# Patient Record
Sex: FEMALE
[De-identification: names, ages, dates, MRNs, and addresses within clinical notes are randomized per-mention and may not be internally consistent; named-entity substitution may affect disease eponyms.]

---

## 2021-08-13 ENCOUNTER — NURSE TRIAGE (OUTPATIENT)
Dept: OTHER | Facility: CLINIC | Age: 31
End: 2021-08-13

## 2021-08-14 NOTE — TELEPHONE ENCOUNTER
Reason for Disposition   MODERATE-SEVERE abdominal pain (e.g., interferes with normal activities, awakens from sleep)    Answer Assessment - Initial Assessment Questions  1. LOCATION: \"Where does it hurt? \"       Left lower abdomen/side and back    2. RADIATION: \"Does the pain shoot anywhere else? \" (e.g., chest, back, shoulder)      Radiating to the back    3. ONSET: \"When did the pain begin? \" (e.g., minutes, hours or days ago)       5 1/2 hours ago    4. ONSET: \"Gradual or sudden onset? \"      Gradual    5. PATTERN \"Does the pain come and go, or has it been constant since it started? \"       Constant    6. SEVERITY: \"How bad is the pain? \" \"What does it keep you from doing? \"  (e.g., Scale 1-10; mild, moderate, or severe)    - MILD (1-3): doesn't interfere with normal activities, abdomen soft and not tender to touch     - MODERATE (4-7): interferes with normal activities or awakens from sleep, tender to touch     - SEVERE (8-10): excruciating pain, doubled over, unable to do any normal activities      9/10    7. RECURRENT SYMPTOM: \"Have you ever had this type of abdominal pain before? \" If so, ask: \"When was the last time? \" and \"What happened that time? \"       Patient reports she only felt this type of pain before when she had kidney stones, that was 5-6 years ago    8. CAUSE: \"What do you think is causing the abdominal pain? \"      Possibly kidney stones    9. RELIEVING/AGGRAVATING FACTORS: \"What makes it better or worse? \" (e.g., antacids, bowel movement, movement)      A little improvement when she got in the bath. But pain got worse since she took a 3rd tylenol    10. OTHER SYMPTOMS: \"Has there been any vaginal bleeding, fever, vomiting, diarrhea, or urine problems? \"        Vomiting    11. LANA: \"What date are you expecting to deliver? \"        February 7th    Protocols used: PREGNANCY - ABDOMINAL PAIN LESS THAN 20 WEEKS EGA-ADULT-AH    Brief description of triage: See above note    Triage indicates for patient to: See disposition    Care advice provided, patient verbalizes understanding; denies any other questions or concerns; instructed to call back for any new or worsening symptoms. This triage is a result of a call to [address] [place] [address]. Please do not respond to the triage nurse through this encounter. Any subsequent communication should be directly with the patient.

## 2021-09-26 ENCOUNTER — NURSE TRIAGE (OUTPATIENT)
Dept: OTHER | Facility: CLINIC | Age: 31
End: 2021-09-26

## 2021-09-26 NOTE — TELEPHONE ENCOUNTER
Brief description of triage: left back pain, bladder feels full but only urinating drops, decreased fetal movement,     Triage indicates for patient to Go to ED Now. Care advice provided, patient verbalizes understanding; denies any other questions or concerns; instructed to call back for any new or worsening symptoms. This triage is a result of a call to [address] [place] [address]. Please do not respond to the triage nurse through this encounter. Any subsequent communication should be directly with the patient. Reason for Disposition   [1] Unable to urinate (or only a few drops) > 4 hours AND     [2] bladder feels very full (e.g., palpable bladder or strong urge to urinate)    Answer Assessment - Initial Assessment Questions  1. ONSET: \"When did the pain begin? \"       3pm yesterday    2. LOCATION: \"Where does it hurt? \" (upper, mid or lower back)      Entire left side of back    3. SEVERITY: \"How bad is the pain? \"  (e.g., Scale 1-10; mild, moderate, or severe)    - MILD (1-3): doesn't interfere with normal activities     - MODERATE (4-7): interferes with normal activities or awakens from sleep     - SEVERE (8-10): excruciating pain, unable to do any normal activities       8/10    4. PATTERN: \"Is the pain constant? \" (e.g., yes, no; constant, intermittent)       Constant    5. RADIATION: \"Does the pain shoot into your legs or elsewhere? \"      Does not radiate    6. CAUSE:  \"What do you think is causing the back pain? \"       Kidney stone? 7. BACK OVERUSE:  Batool Laughter recent lifting of heavy objects, strenuous work or exercise? \"      No    8. MEDICATIONS: \"What have you taken so far for the pain? \" (e.g., nothing, acetaminophen)      Tylenol    9. NEUROLOGIC SYMPTOMS: \"Do you have any weakness, numbness, or problems with bowel/bladder control? \"      No    10. OTHER SYMPTOMS: \"Do you have any other symptoms? \" (e.g., fever, abdominal pain, burning with urination, blood in urine, fluid leaking from vagina)        No    11.  LAAN: \"What date are you expecting to deliver?\"        2/7/22, 5 months    Protocols used: PREGNANCY - BACK PAIN-ADULT-AH

## 2021-11-01 ENCOUNTER — NURSE TRIAGE (OUTPATIENT)
Dept: OTHER | Facility: CLINIC | Age: 31
End: 2021-11-01

## 2021-11-02 NOTE — TELEPHONE ENCOUNTER
Brief description of triage: Geena Dawson was calling to ask about a nephrostomy tube that looks infected and is red and purulent. After discussing she mentioned that her surgeon told her to go to ED. I also stated she should follow her 's recommendation. Triage indicates for patient to No triage completed. Care advice provided, patient verbalizes understanding; denies any other questions or concerns; instructed to call back for any new or worsening symptoms. This triage is a result of a call to [address] [place] [address]. Please do not respond to the triage nurse through this encounter. Any subsequent communication should be directly with the patient. Reason for Disposition   Caller has already spoken with the PCP and has no further questions. Answer Assessment - Initial Assessment Questions  1. TYPE: \"What type of ostomy do you have? \" (e.g., ileostomy, colostomy; temporary, long-term [permanent]). Nephrostomy tube    2. LOCATION: \"Where is the ostomy located? \"      Left side    3. DURATION: \"How long have you had the ostomy? \" (e.g., days, weeks, months, years). 2 weeks tomorrow    4. MAIN CONCERN: Carmen Estimable is your main concern or symptom today? \" (e.g., skin redness or irritation, bleeding, leaking, change in output or appearance of ostomy). Red and purulent     5. DAILY OSTOMY OUTPUT DIARY: \"Do you keep a daily diary of the output from your ostomy\"? 1/2 a bag every hour an 1/2    6. OSTOMY OUTPUT: \"How much stool output over a 24 hour period are you having? \" (e.g., 0 ml; consistency of water, milkshake, pudding)      Dark yellow    7. OTHER SYMPTOMS: Ryan Annaies you having any other symptoms? \" (e.g., fever, vomiting, blood in stool, abdominal pain, dizziness)      Back pain.     Protocols used: NO CONTACT OR DUPLICATE CONTACT CALL-ADULT-, OSTOMY SYMPTOMS AND QUESTIONS-ADULT-

## 2023-04-25 PROBLEM — N20.1 LEFT URETERAL STONE: Status: ACTIVE | Noted: 2023-04-25

## 2023-04-25 PROBLEM — R87.611 PAPANICOLAOU SMEAR OF CERVIX WITH ATYPICAL SQUAMOUS CELLS CANNOT EXCLUDE HIGH GRADE SQUAMOUS INTRAEPITHELIAL LESION (ASC-H): Status: ACTIVE | Noted: 2023-04-25

## 2023-04-25 PROBLEM — R10.9 FLANK PAIN: Status: ACTIVE | Noted: 2023-04-25

## 2023-04-25 PROBLEM — H50.811: Status: ACTIVE | Noted: 2023-04-25

## 2023-04-25 PROBLEM — N13.30 HYDRONEPHROSIS: Status: ACTIVE | Noted: 2023-04-25

## 2023-04-25 PROBLEM — Q87.0 GOLDENHAR SYNDROME: Status: ACTIVE | Noted: 2023-04-25

## 2023-04-25 PROBLEM — N20.0 NEPHROLITHIASIS: Status: ACTIVE | Noted: 2023-04-25

## 2023-04-25 PROBLEM — Z14.8 CARRIER OF GENETIC DISORDER: Status: ACTIVE | Noted: 2023-04-25

## 2023-04-25 PROBLEM — R11.2 NAUSEA AND VOMITING: Status: ACTIVE | Noted: 2023-04-25

## 2023-04-25 PROBLEM — K21.9 GERD (GASTROESOPHAGEAL REFLUX DISEASE): Status: ACTIVE | Noted: 2023-04-25

## 2023-04-25 PROBLEM — H61.22 IMPACTED CERUMEN OF LEFT EAR: Status: ACTIVE | Noted: 2023-04-25

## 2023-04-25 PROBLEM — H90.12 CONDUCTIVE HEARING LOSS IN LEFT EAR: Status: ACTIVE | Noted: 2023-04-25

## 2023-04-25 PROBLEM — H93.8X2 SENSATION OF PLUGGED EAR ON LEFT SIDE: Status: ACTIVE | Noted: 2023-04-25

## 2023-04-25 PROBLEM — H72.92 PERFORATION OF LEFT TYMPANIC MEMBRANE: Status: ACTIVE | Noted: 2023-04-25

## 2023-04-25 PROBLEM — H90.71 MIXED CONDUCTIVE AND SENSORINEURAL HEARING LOSS OF RIGHT EAR: Status: ACTIVE | Noted: 2023-04-25

## 2023-04-25 RX ORDER — ACETAMINOPHEN 500 MG
TABLET ORAL EVERY 6 HOURS PRN
COMMUNITY
Start: 2022-01-30 | End: 2023-05-11 | Stop reason: WASHOUT

## 2023-04-25 RX ORDER — FERROUS SULFATE 325(65) MG
TABLET ORAL
COMMUNITY
Start: 2022-01-31 | End: 2023-05-11 | Stop reason: WASHOUT

## 2023-04-25 RX ORDER — DROSPIRENONE AND ETHINYL ESTRADIOL 0.02-3(28)
1 KIT ORAL DAILY
COMMUNITY
Start: 2022-07-15 | End: 2023-05-11 | Stop reason: WASHOUT

## 2023-04-25 RX ORDER — DOCUSATE SODIUM 100 MG/1
CAPSULE, LIQUID FILLED ORAL 2 TIMES DAILY PRN
COMMUNITY
Start: 2022-01-30 | End: 2023-05-11 | Stop reason: WASHOUT

## 2023-04-25 RX ORDER — IBUPROFEN 600 MG/1
TABLET ORAL EVERY 6 HOURS PRN
COMMUNITY
Start: 2022-01-30 | End: 2023-05-11 | Stop reason: WASHOUT

## 2023-05-11 ENCOUNTER — OFFICE VISIT (OUTPATIENT)
Dept: PRIMARY CARE | Facility: CLINIC | Age: 33
End: 2023-05-11
Payer: COMMERCIAL

## 2023-05-11 VITALS
HEIGHT: 61 IN | DIASTOLIC BLOOD PRESSURE: 70 MMHG | SYSTOLIC BLOOD PRESSURE: 110 MMHG | RESPIRATION RATE: 21 BRPM | BODY MASS INDEX: 26.43 KG/M2 | WEIGHT: 140 LBS | HEART RATE: 64 BPM

## 2023-05-11 DIAGNOSIS — H50.811 DUANE SYNDROME OF RIGHT EYE: ICD-10-CM

## 2023-05-11 DIAGNOSIS — Q87.0 GOLDENHAR SYNDROME: ICD-10-CM

## 2023-05-11 DIAGNOSIS — K21.9 GASTROESOPHAGEAL REFLUX DISEASE WITHOUT ESOPHAGITIS: ICD-10-CM

## 2023-05-11 DIAGNOSIS — Z00.00 ROUTINE GENERAL MEDICAL EXAMINATION AT A HEALTH CARE FACILITY: Primary | ICD-10-CM

## 2023-05-11 DIAGNOSIS — H90.A12 CONDUCTIVE HEARING LOSS OF LEFT EAR WITH RESTRICTED HEARING OF RIGHT EAR: ICD-10-CM

## 2023-05-11 PROBLEM — N20.1 LEFT URETERAL STONE: Status: RESOLVED | Noted: 2023-04-25 | Resolved: 2023-05-11

## 2023-05-11 PROBLEM — H72.92 PERFORATION OF LEFT TYMPANIC MEMBRANE: Status: RESOLVED | Noted: 2023-04-25 | Resolved: 2023-05-11

## 2023-05-11 PROBLEM — H93.8X2 SENSATION OF PLUGGED EAR ON LEFT SIDE: Status: RESOLVED | Noted: 2023-04-25 | Resolved: 2023-05-11

## 2023-05-11 PROBLEM — H61.22 IMPACTED CERUMEN OF LEFT EAR: Status: RESOLVED | Noted: 2023-04-25 | Resolved: 2023-05-11

## 2023-05-11 PROBLEM — N13.30 HYDRONEPHROSIS: Status: RESOLVED | Noted: 2023-04-25 | Resolved: 2023-05-11

## 2023-05-11 PROBLEM — R11.2 NAUSEA AND VOMITING: Status: RESOLVED | Noted: 2023-04-25 | Resolved: 2023-05-11

## 2023-05-11 PROBLEM — R10.9 FLANK PAIN: Status: RESOLVED | Noted: 2023-04-25 | Resolved: 2023-05-11

## 2023-05-11 PROCEDURE — 99395 PREV VISIT EST AGE 18-39: CPT | Performed by: INTERNAL MEDICINE

## 2023-05-11 PROCEDURE — 1036F TOBACCO NON-USER: CPT | Performed by: INTERNAL MEDICINE

## 2023-05-11 RX ORDER — LEVONORGESTREL AND ETHINYL ESTRADIOL 0.1-0.02MG
KIT ORAL
COMMUNITY
Start: 2022-06-04 | End: 2024-01-25 | Stop reason: ALTCHOICE

## 2023-05-11 ASSESSMENT — ENCOUNTER SYMPTOMS
NECK STIFFNESS: 0
EYE ITCHING: 0
POLYDIPSIA: 0
RECTAL PAIN: 0
NERVOUS/ANXIOUS: 0
FREQUENCY: 0
APPETITE CHANGE: 0
CHOKING: 0
COUGH: 0
UNEXPECTED WEIGHT CHANGE: 0
BRUISES/BLEEDS EASILY: 0
SEIZURES: 0
VOMITING: 0
CHEST TIGHTNESS: 0
ABDOMINAL DISTENTION: 0
TREMORS: 0
TROUBLE SWALLOWING: 0
NAUSEA: 0
APNEA: 0
ADENOPATHY: 0
WHEEZING: 0
EYE REDNESS: 0
SORE THROAT: 0
SLEEP DISTURBANCE: 0
POLYPHAGIA: 0
EYE PAIN: 0
RHINORRHEA: 0
MYALGIAS: 0
DYSURIA: 0
ANAL BLEEDING: 0
FEVER: 0
BACK PAIN: 0
DECREASED CONCENTRATION: 0
HYPERACTIVE: 0
ABDOMINAL PAIN: 0
CHILLS: 0
DIAPHORESIS: 0
NUMBNESS: 0
HEADACHES: 0
ARTHRALGIAS: 0
WOUND: 0
DIFFICULTY URINATING: 0
NECK PAIN: 0
FACIAL SWELLING: 0
SINUS PRESSURE: 0
VOICE CHANGE: 0
DIZZINESS: 0
AGITATION: 0
PHOTOPHOBIA: 0
FACIAL ASYMMETRY: 0
PALPITATIONS: 0
BLOOD IN STOOL: 0
ACTIVITY CHANGE: 0
SHORTNESS OF BREATH: 0
HALLUCINATIONS: 0
CONFUSION: 0
STRIDOR: 0
DIARRHEA: 0
LIGHT-HEADEDNESS: 0
JOINT SWELLING: 0
HEMATURIA: 0
CONSTIPATION: 0
DYSPHORIC MOOD: 0
FLANK PAIN: 0
FATIGUE: 0
SINUS PAIN: 0
WEAKNESS: 0
COLOR CHANGE: 0
EYE DISCHARGE: 0
SPEECH DIFFICULTY: 0

## 2023-05-11 NOTE — PATIENT INSTRUCTIONS
Continue to work on healthy diet and regular exercise for weight control  Continue to see GYN at least annually  Look for flu and covid boosters in fall  Follow up here in 1 year

## 2023-05-11 NOTE — PROGRESS NOTES
Subjective   Patient ID: Peng Jovel is a 33 y.o. female who presents for Annual Exam.  HPI    Pt here for physical.  She is working on more exercise.  She is down a few pounds in weight.    She had flu shot in fall and Covid plus the bivalent booster.      She sees GYN annually.  She has follow up next week.  She is only on OCP use.  She was having some ovarian cyst issues in the past.      She has no other complaints or issues at this time.  She had labs in early 1/2023 when having pelvic discomfort that only showed mild elevation in WBC.          Review of Systems   Constitutional:  Negative for activity change, appetite change, chills, diaphoresis, fatigue, fever and unexpected weight change.   HENT:  Negative for congestion, dental problem, drooling, ear discharge, ear pain, facial swelling, hearing loss, mouth sores, nosebleeds, postnasal drip, rhinorrhea, sinus pressure, sinus pain, sneezing, sore throat, tinnitus, trouble swallowing and voice change.    Eyes:  Negative for photophobia, pain, discharge, redness, itching and visual disturbance (wears glasses-has exam in July).   Respiratory:  Negative for apnea, cough, choking, chest tightness, shortness of breath, wheezing and stridor.    Cardiovascular:  Negative for chest pain, palpitations and leg swelling.   Gastrointestinal:  Negative for abdominal distention, abdominal pain, anal bleeding, blood in stool, constipation, diarrhea, nausea, rectal pain and vomiting.   Endocrine: Negative for cold intolerance, heat intolerance, polydipsia, polyphagia and polyuria.   Genitourinary:  Negative for decreased urine volume, difficulty urinating, dyspareunia, dysuria, enuresis, flank pain, frequency, genital sores, hematuria, menstrual problem, pelvic pain, urgency, vaginal bleeding, vaginal discharge and vaginal pain.   Musculoskeletal:  Negative for arthralgias, back pain, gait problem, joint swelling, myalgias, neck pain and neck stiffness.   Skin:   "Negative for color change, pallor, rash and wound.   Allergic/Immunologic: Negative for environmental allergies, food allergies and immunocompromised state.   Neurological:  Negative for dizziness, tremors, seizures, syncope, facial asymmetry, speech difficulty, weakness, light-headedness, numbness and headaches.   Hematological:  Negative for adenopathy. Does not bruise/bleed easily.   Psychiatric/Behavioral:  Negative for agitation, behavioral problems, confusion, decreased concentration, dysphoric mood, hallucinations, self-injury, sleep disturbance and suicidal ideas. The patient is not nervous/anxious and is not hyperactive.        Objective   /70   Pulse 64   Resp 21   Ht 1.549 m (5' 1\")   Wt 63.5 kg (140 lb)   BMI 26.45 kg/m²    Physical Exam  Constitutional:       Appearance: Normal appearance.   HENT:      Head: Normocephalic and atraumatic.      Right Ear: There is no impacted cerumen.      Left Ear: Tympanic membrane, ear canal and external ear normal. There is no impacted cerumen.      Ears:      Comments: Right ear with prosthesis   +hearing aide of left ear      Nose: Congestion present. No rhinorrhea.      Mouth/Throat:      Mouth: Mucous membranes are moist.      Pharynx: Oropharynx is clear. No oropharyngeal exudate or posterior oropharyngeal erythema.      Comments: Surgical scar noted from palate surgery   Eyes:      Extraocular Movements: Extraocular movements intact.      Conjunctiva/sclera: Conjunctivae normal.      Pupils: Pupils are equal, round, and reactive to light.   Neck:      Vascular: No carotid bruit.   Cardiovascular:      Rate and Rhythm: Normal rate and regular rhythm.      Pulses: Normal pulses.      Heart sounds: Normal heart sounds. No murmur heard.  Pulmonary:      Effort: Pulmonary effort is normal. No respiratory distress.      Breath sounds: Normal breath sounds. No wheezing, rhonchi or rales.   Abdominal:      General: Abdomen is flat. Bowel sounds are normal. " There is no distension.      Palpations: Abdomen is soft.      Tenderness: There is no abdominal tenderness.      Hernia: No hernia is present.   Musculoskeletal:         General: No swelling or tenderness. Normal range of motion.      Cervical back: Normal range of motion and neck supple.      Right lower leg: No edema.      Left lower leg: No edema.   Lymphadenopathy:      Cervical: No cervical adenopathy.   Skin:     General: Skin is warm and dry.      Findings: No lesion or rash.   Neurological:      General: No focal deficit present.      Mental Status: She is alert and oriented to person, place, and time.      Cranial Nerves: No cranial nerve deficit.      Sensory: No sensory deficit.      Motor: No weakness.   Psychiatric:         Mood and Affect: Mood normal.         Behavior: Behavior normal.         Thought Content: Thought content normal.         Judgment: Judgment normal.         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

## 2023-10-03 PROBLEM — D06.9 CERVICAL INTRAEPITHELIAL NEOPLASIA GRADE 3: Status: ACTIVE | Noted: 2023-10-03

## 2023-10-03 PROBLEM — N92.6 IRREGULAR MENSTRUAL CYCLE: Status: ACTIVE | Noted: 2023-10-03

## 2023-10-03 PROBLEM — H11.9 CONJUNCTIVAL LESION: Status: ACTIVE | Noted: 2023-10-03

## 2023-10-03 PROBLEM — D22.9 MELANOCYTIC NEVUS OF SKIN: Status: ACTIVE | Noted: 2023-10-03

## 2023-10-03 PROBLEM — N83.8 OVARIAN MASS, LEFT: Status: ACTIVE | Noted: 2023-10-03

## 2023-10-03 PROBLEM — H40.003 GLAUCOMA SUSPECT OF BOTH EYES: Status: ACTIVE | Noted: 2023-10-03

## 2023-10-03 RX ORDER — DROSPIRENONE AND ETHINYL ESTRADIOL 0.02-3(28)
1 KIT ORAL DAILY
COMMUNITY
Start: 2022-07-15 | End: 2023-10-16 | Stop reason: SDUPTHER

## 2023-10-05 ENCOUNTER — OFFICE VISIT (OUTPATIENT)
Dept: OTOLARYNGOLOGY | Facility: CLINIC | Age: 33
End: 2023-10-05
Payer: COMMERCIAL

## 2023-10-05 VITALS
TEMPERATURE: 97.9 F | HEIGHT: 62 IN | SYSTOLIC BLOOD PRESSURE: 125 MMHG | DIASTOLIC BLOOD PRESSURE: 68 MMHG | HEART RATE: 93 BPM | BODY MASS INDEX: 26.87 KG/M2 | WEIGHT: 146 LBS

## 2023-10-05 DIAGNOSIS — H61.22 EXCESSIVE CERUMEN IN LEFT EAR CANAL: ICD-10-CM

## 2023-10-05 DIAGNOSIS — H90.12 CONDUCTIVE HEARING LOSS OF LEFT EAR, UNSPECIFIED HEARING STATUS ON CONTRALATERAL SIDE: ICD-10-CM

## 2023-10-05 DIAGNOSIS — H90.71 MIXED CONDUCTIVE AND SENSORINEURAL HEARING LOSS OF RIGHT EAR, UNSPECIFIED HEARING STATUS ON CONTRALATERAL SIDE: Primary | ICD-10-CM

## 2023-10-05 DIAGNOSIS — Z96.21 STATUS POST PLACEMENT OF BONE ANCHORED HEARING AID (BAHA): ICD-10-CM

## 2023-10-05 DIAGNOSIS — H72.92 PERFORATION OF LEFT TYMPANIC MEMBRANE: ICD-10-CM

## 2023-10-05 PROCEDURE — 1036F TOBACCO NON-USER: CPT | Performed by: NURSE PRACTITIONER

## 2023-10-05 PROCEDURE — 99213 OFFICE O/P EST LOW 20 MIN: CPT | Performed by: NURSE PRACTITIONER

## 2023-10-05 ASSESSMENT — ENCOUNTER SYMPTOMS
OCCASIONAL FEELINGS OF UNSTEADINESS: 0
DEPRESSION: 0
LOSS OF SENSATION IN FEET: 0

## 2023-10-05 ASSESSMENT — PATIENT HEALTH QUESTIONNAIRE - PHQ9
SUM OF ALL RESPONSES TO PHQ9 QUESTIONS 1 AND 2: 0
1. LITTLE INTEREST OR PLEASURE IN DOING THINGS: NOT AT ALL
2. FEELING DOWN, DEPRESSED OR HOPELESS: NOT AT ALL

## 2023-10-05 ASSESSMENT — COLUMBIA-SUICIDE SEVERITY RATING SCALE - C-SSRS
1. IN THE PAST MONTH, HAVE YOU WISHED YOU WERE DEAD OR WISHED YOU COULD GO TO SLEEP AND NOT WAKE UP?: NO
6. HAVE YOU EVER DONE ANYTHING, STARTED TO DO ANYTHING, OR PREPARED TO DO ANYTHING TO END YOUR LIFE?: NO
2. HAVE YOU ACTUALLY HAD ANY THOUGHTS OF KILLING YOURSELF?: NO

## 2023-10-05 ASSESSMENT — PAIN SCALES - GENERAL: PAINLEVEL: 0-NO PAIN

## 2023-10-05 NOTE — PROGRESS NOTES
Subjective   Patient ID: Peng Jovel is a 33 y.o. female who presents for Cerumen Impaction.  HPI  Last saw Dr. Whiteside on 6/20/2023.  Here for routine ear cleaning and follow up.   She gets some pain in the left ear. Overall no complaints. No drainage.     Past Medical History:   Diagnosis Date    Acute upper respiratory infection, unspecified 01/29/2017    Viral upper respiratory tract infection    Conductive hearing loss, bilateral 06/15/2021    Conductive hearing loss, bilateral    Hydronephrosis 04/25/2023    Kidney stone     Left ureteral stone 04/25/2023    Microtia 06/15/2021    Microtia    Myopia, bilateral 06/07/2021    Bilateral myopia    Myopia, bilateral 06/07/2021    Myopia of both eyes with regular astigmatism    Other congenital deformities of skull, face and jaw     Hemifacial microsomia    Other disturbances of smell and taste 12/31/2019    Decreased sense of smell    Other symptoms and signs involving general sensations and perceptions 12/31/2019    Facial pressure    Otitis media, unspecified, left ear 01/11/2022    Acute left otitis media    Otitis media, unspecified, unspecified ear 06/15/2021    Ear infection    Otorrhea, left ear 06/15/2021    Otorrhea of left ear    Perforation of left tympanic membrane 04/25/2023    Personal history of (corrected) cleft lip and palate 09/02/2014    History of cleft palate with cleft lip    Personal history of other complications of pregnancy, childbirth and the puerperium 01/13/2022    History of pyelonephritis during pregnancy    Personal history of other diseases of the nervous system and sense organs     History of drainage from eye    Personal history of other diseases of the respiratory system     History of sore throat    Personal history of other diseases of the respiratory system 11/07/2017    History of paranasal sinus congestion    Personal history of other diseases of the respiratory system 12/31/2019    History of acute sinusitis     Personal history of other diseases of the respiratory system 2019    History of nasal discharge    Personal history of other diseases of the respiratory system 2019    History of sinusitis    Personal history of other diseases of the respiratory system 2020    History of chronic sinusitis    Personal history of other specified conditions 2019    History of facial pain    Personal history of other specified conditions 2020    History of nasal congestion    Personal history of other specified conditions 2020    History of nasal congestion    Sneezing 2020    Sneezing with watery eyes    Unspecified conjunctivitis 2017    Right conjunctivitis    Unspecified otitis externa, left ear 2021    Left otitis externa    Unspecified otitis externa, left ear 2016    Left otitis externa    Urinary tract infection, site not specified 2022    Bacterial UTI      Past Surgical History:   Procedure Laterality Date     SECTION, LOW TRANSVERSE      IR NEPHROSTOMY TUBE EXCHANGE  12/15/2021    IR NEPHROSTOMY TUBE EXCHANGE 12/15/2021 [facility]    MANDIBLE SURGERY  2014    Jaw Surgery    NOSE SURGERY  2014    Nose Surgery    OTHER SURGICAL HISTORY  2014    Biopsy Soft Palate Midline      Review of Systems   All other systems reviewed and are negative.    Objective   Physical Exam  Right Ear: Atresia with prosthesis. BAHA abudment site with some crusting and surrounding erythema. No drainage or tenderness.     Left Ear: External inspection of ear with no deformity, scars, or masses. EAC is with some non occluding cerumen that was removed using the microscope and suction .  TM with perforation about 60%. Some mucosalization of the superior aspect of the remnant TM. Middle ear is dry, no evidence of infection or cholesteatoma.     Assessment/Plan   -Use  Mupirocin ointment twice daily on right-sided BAHA abudment site  -Return to clinic in 6 months  for follow up.    All questions answered to patient satisfaction.

## 2023-10-16 DIAGNOSIS — Z30.8 ENCOUNTER FOR OTHER CONTRACEPTIVE MANAGEMENT: Primary | ICD-10-CM

## 2023-10-16 RX ORDER — DROSPIRENONE AND ETHINYL ESTRADIOL 0.02-3(28)
1 KIT ORAL DAILY
Qty: 84 TABLET | Refills: 2 | Status: SHIPPED
Start: 2023-10-16 | End: 2024-01-25 | Stop reason: ALTCHOICE

## 2024-01-02 ENCOUNTER — OFFICE VISIT (OUTPATIENT)
Dept: OTOLARYNGOLOGY | Facility: CLINIC | Age: 34
End: 2024-01-02
Payer: COMMERCIAL

## 2024-01-02 VITALS — HEIGHT: 61 IN | BODY MASS INDEX: 27.38 KG/M2 | WEIGHT: 145 LBS

## 2024-01-02 DIAGNOSIS — H90.A31 MIXED CONDUCTIVE AND SENSORINEURAL HEARING LOSS OF RIGHT EAR WITH RESTRICTED HEARING OF LEFT EAR: ICD-10-CM

## 2024-01-02 DIAGNOSIS — Q87.0 GOLDENHAR SYNDROME: ICD-10-CM

## 2024-01-02 DIAGNOSIS — H90.A12 CONDUCTIVE HEARING LOSS OF LEFT EAR WITH RESTRICTED HEARING OF RIGHT EAR: Primary | ICD-10-CM

## 2024-01-02 PROCEDURE — 1036F TOBACCO NON-USER: CPT | Performed by: OTOLARYNGOLOGY

## 2024-01-02 PROCEDURE — 92504 EAR MICROSCOPY EXAMINATION: CPT | Performed by: OTOLARYNGOLOGY

## 2024-01-02 PROCEDURE — 99213 OFFICE O/P EST LOW 20 MIN: CPT | Performed by: OTOLARYNGOLOGY

## 2024-01-02 NOTE — LETTER
January 2, 2024     Dalila CHADWICK DO  [address] [facility] [address]    Patient: Peng Jovel   YOB: 1990   Date of Visit: 1/2/2024       Dear Dr. Dalila CHADWICK DO:    I had the pleasure of seeing your patient, Peng Jovel today. Below are my notes for this consultation.  If you have questions, please do not hesitate to call me. Thank you for allowing me to participate in the care of your patient.        Sincerely,     Nancy Whiteside MD      CC: No Recipients  _____________________________________________________________________________    33 y.o. 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 She feels she's due for a left ear cleaning, she's had some fullness on that side.     - If she had exacerbation, use a week of ear drops   - RTC in 9 months

## 2024-01-02 NOTE — PATIENT INSTRUCTIONS
Welcome to Dr. Whiteside's clinic. We are here to assist you through your ENT care at [facility].  Dr. Whiteside is an Ear surgeon. This means that she specializes in taking care of patients with complex ear problems.     Dr. Whiteside's office number is [phone]. While you may see her at a satellite office, she has a team committed to help meet your healthcare needs at [facility]'s [facility]. This number is the most direct way to communicate with the office.     Carmen is Dr. Whiteside's  and she answers the office phone from 8am-4pm Mon-Fri. She can help you with many general questions and information. Questions that she cannot answer will be directed to the appropriate staff. You may need to leave a message. In this case, someone from the team will call you back.     Luis is Dr. Whiteside's primary nurse and can be reached by calling the office. Luis is in clinic with Dr. Whiteside's on Mondays and Tuesdays. Non-urgent calls will be returned on non-clinic days typically Thursdays.     Sometimes, other team members will also be involved in your care. These people may include dieticians, social workers, speech therapists, audiologist, neurologist, and physical therapist. Dr. Whiteside will provide these referrals as needed. Please let her know if you would like to request a specific referral.     For your convenience, Dr. Whiteside sees patients at several [facility] locations including [facility] and [facility] at the main campus of [facility]. While we try to make your appointments as convenient as possible, occasionally a visit to another location may be necessary to provide the best care for you. We look forward to working with you to meet your healthcare goals.     Dr. Whiteside makes every effort to run on time for your appointments. Therefore, if you are more than 30 minutes late unrelated to a scan or another appointment such therapy or audio you will have to reschedule.  
outpatient PT/outpatient PT services for gait and balance, endurance training/home

## 2024-01-02 NOTE — PROGRESS NOTES
History Of Present Illness:  Peng Jovel is a 33 y.o. 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 She does feel she's due for a left ear cleaning, she's had some fullness on that side.     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 She's not currently wearing her right-sided BAHA. She denies any significant ear problems since her last visit.     Surgical History:  She has a past surgical history that includes Nose surgery (2014); Mandible surgery (2014); Other surgical history (2014); IR nephrostomy tube exchange (12/15/2021); and  section, low transverse.     Allergies:  Adhesive tape-silicones, Diphenhydramine, and Sulfa (sulfonamide antibiotics)    Medications:   Current Outpatient Medications   Medication Instructions    drospirenone-ethinyl estradioL (Sheila, Angela,) 3-0.02 mg tablet 1 tablet, oral, Daily    Lessina 0.1-20 mg-mcg tablet       Review of Systems:   A comprehensive 10-point review of systems was obtained including constitutional, neurological, HEENT, pulmonary, cardiovascular, genito-urinary, and other pertinent systems and was negative except as noted in the HPI.      Physical Exam:  Constitutional   General appearance: Healthy-appearing, well-nourished, well groomed, in no acute distress.   Ability to communicate: Normal communication without aids, normal voice quality.       Head and face: Atraumatic with no masses, lesions, or scarring. Face exam consistent with Goldenhar on right.   Facial strength: Normal strength and symmetry, no synkinesis or facial tic.     Ears  Otoscopic examination:   Left: Ear canal with some waxy debris at meatus suctioned out,  "medial perforation in TM that's about 50%, TM slightly more erythematous than typical, middle ear mucosa not inflamed, no debris in middle ear space      Nose: Dorsum symmetric with no visible or palpable deformities.     Oral Cavity/Mouth  Lips, teeth, and gums: Normal lips, gums, and dentition.     Oropharynx: Mucosa moist, no lesions.     Neck: Symmetrical, trachea midline. No masses visible.     Neurological/Psychiatric  Cranial Nerve Examination: II - XII grossly intact.  Orientation to person, place, and time: Normal.  Mood and affect: Normal.     Skin: Normal without rashes or lesions.     Pulmonary  Respiratory effort: Chest expands symmetrically.     Cardiovascular: Good peripheral pulses  Peripheral vascular system: No varicosities, carotid pulse normal, no edema. No jugular venous distension.     Extremities: Appearance of extremities: Normal. Gait normal.     Procedure:  Ear Cleaning   Procedure: Cerumen Removal   Indiation: TM Not Visible   Location: Left Ear  Prep: Nothing was placed in the ear canal(s) prior to the procedure.   Preformed by: The procedure was performed by the Provider.  Visualization Instrument: A Microscope and Speculum were placed in the ear canal(s) to visualize the ear canal debris.   Ear cleaning Instruments: Suction were used during the procedure.   Outcome: The procedure was successful.   Patient Status: The patient tolerated the procedure well.   Complications: There were no complications.   Patient Instructions: Avoid Using Q-Tips     Last Recorded Vitals:  Height 1.549 m (5' 1\"), weight 65.8 kg (145 lb).     Assessment/Plan   33 y.o. 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 She feels she's due for a left ear cleaning, she's had some fullness on that side.     - If she had exacerbation, use a week of ear drops   - RTC in 9 months    "   Scribe Attestation:  By signing my name below, I, Marisa Resendiz , Scrjanine   attest that this documentation has been prepared under the direction and in the presence of Nancy Whiteside MD.    I have reviewed the documentation as scribed by Marisa Resendiz and agree with the notes.   Nancy Whiteside MD

## 2024-01-11 ENCOUNTER — APPOINTMENT (OUTPATIENT)
Dept: OTOLARYNGOLOGY | Facility: CLINIC | Age: 34
End: 2024-01-11
Payer: COMMERCIAL

## 2024-01-24 ENCOUNTER — APPOINTMENT (OUTPATIENT)
Dept: OBSTETRICS AND GYNECOLOGY | Facility: CLINIC | Age: 34
End: 2024-01-24
Payer: COMMERCIAL

## 2024-01-24 PROBLEM — Z01.419 WELL WOMAN EXAM: Status: ACTIVE | Noted: 2024-01-24

## 2024-01-24 NOTE — ASSESSMENT & PLAN NOTE
Thank you for your visit for well woman care.  At your visit, you had a pap smear performed. The results will be available in "[employer]"hart in two to three weeks.  You are happy with your current birth control. Let me know if you change your mind or have questions.

## 2024-01-25 ENCOUNTER — OFFICE VISIT (OUTPATIENT)
Dept: OBSTETRICS AND GYNECOLOGY | Facility: CLINIC | Age: 34
End: 2024-01-25
Payer: COMMERCIAL

## 2024-01-25 ENCOUNTER — APPOINTMENT (OUTPATIENT)
Dept: OTOLARYNGOLOGY | Facility: CLINIC | Age: 34
End: 2024-01-25
Payer: COMMERCIAL

## 2024-01-25 VITALS
HEIGHT: 61 IN | BODY MASS INDEX: 28.89 KG/M2 | SYSTOLIC BLOOD PRESSURE: 118 MMHG | WEIGHT: 153 LBS | DIASTOLIC BLOOD PRESSURE: 66 MMHG

## 2024-01-25 DIAGNOSIS — Z01.419 ENCOUNTER FOR ANNUAL ROUTINE GYNECOLOGICAL EXAMINATION: Primary | ICD-10-CM

## 2024-01-25 DIAGNOSIS — G43.119 INTRACTABLE MIGRAINE WITH AURA WITHOUT STATUS MIGRAINOSUS: ICD-10-CM

## 2024-01-25 DIAGNOSIS — D06.9 CERVICAL INTRAEPITHELIAL NEOPLASIA GRADE 3: ICD-10-CM

## 2024-01-25 DIAGNOSIS — Z30.41 ENCOUNTER FOR SURVEILLANCE OF CONTRACEPTIVE PILLS: ICD-10-CM

## 2024-01-25 PROCEDURE — 88175 CYTOPATH C/V AUTO FLUID REDO: CPT

## 2024-01-25 PROCEDURE — 99395 PREV VISIT EST AGE 18-39: CPT | Performed by: OBSTETRICS & GYNECOLOGY

## 2024-01-25 PROCEDURE — 1036F TOBACCO NON-USER: CPT | Performed by: OBSTETRICS & GYNECOLOGY

## 2024-01-25 PROCEDURE — 87624 HPV HI-RISK TYP POOLED RSLT: CPT

## 2024-01-25 RX ORDER — LEVONORGESTREL AND ETHINYL ESTRADIOL 0.1-0.02MG
KIT ORAL
Qty: 90 TABLET | Refills: 3 | Status: CANCELLED | OUTPATIENT
Start: 2024-01-25

## 2024-01-25 NOTE — PROGRESS NOTES
"Assessment   PLAN  Thank you for coming to your annual exam. We discussed healthy lifestyle, well woman screening, and other diagnoses listed below.    Peng was seen today for annual exam.  Diagnoses and all orders for this visit:  Encounter for annual routine gynecological examination (Primary)  Encounter for surveillance of contraceptive pills  Intractable migraine with aura without status migrainosus  -     drospirenone, contraceptive, 4 mg (28) tablet; Take 1 tablet by mouth once daily.    Discussed with patient regarding recent migraine with aura and risk for VTE. Pt amenable to try Slynd POP for now. Will continue to monitor.  Please return for your next visit in 1 year.    Seen and d/w Dr. Ayala.  Jailyn Mo MD    Subjective     Peng Jovel is a 33 y.o. female who is here for a routine exam.   PCP = DO NATA Sy Concerns: previously had dizzy spell/seeing stars while on period with migraine that followed, now taking OCPs continuously.  Other Concerns: none    GynHx:  Menstrual Pattern: continuous OCPs  Contraception: OCPs    Pap Hx:  Collected today  Last 2023 NILM  Last 2022 LEEP JAS 3    Preventative:  Last mammogram: due age 41  Last Colonoscopy: due age 46  DM Screening: due age 36  DEXA: due age 66  HPV vaccination: yes  Exercise: exercising when she can  Diet: no restrictions  Seat Belt Use: always    SoHx:  Living Situation: lives at home with son and   School/Employment:  at elementary school    PMH, PSH reviewed and edited.    FamHx: No fam hx of breast, uterine, ovarian, fallopian tube, or colon CA.    Objective   /66   Ht 1.549 m (5' 1\")   Wt 69.4 kg (153 lb)   LMP 12/07/2023   BMI 28.91 kg/m²      General:   Alert and oriented, in no acute distress   Neck: Supple. No visible thyromegaly.    Breast/Axilla: Normal to palpation bilaterally without masses, skin changes, or nipple discharge.    Abdomen: Soft, non-tender, without masses or " organomegaly   Vulva: Normal architecture without erythema, masses, or lesions.    Vagina: Normal mucosa without lesions, masses, or atrophy. No abnormal vaginal discharge.    Cervix: Normal without masses, lesions, or signs of cervicitis.    Uterus: Normal mobile, non-enlarged uterus    Adnexa: Normal without masses or lesions   Pelvic Floor No POP noted. No high tone pelvic floor    Psych Normal affect. Normal mood.

## 2024-02-07 LAB
CYTOLOGY CMNT CVX/VAG CYTO-IMP: NORMAL
HPV HR 12 DNA GENITAL QL NAA+PROBE: NEGATIVE
HPV HR GENOTYPES PNL CVX NAA+PROBE: NEGATIVE
HPV16 DNA SPEC QL NAA+PROBE: NEGATIVE
HPV18 DNA SPEC QL NAA+PROBE: NEGATIVE
LAB AP HPV GENOTYPE QUESTION: YES
LAB AP HPV HR: NORMAL
LAB AP TREATMENT HISTORY: NORMAL
LABORATORY COMMENT REPORT: NORMAL
LMP START DATE: NORMAL
PATH REPORT.TOTAL CANCER: NORMAL

## 2024-02-13 ENCOUNTER — APPOINTMENT (OUTPATIENT)
Dept: OTOLARYNGOLOGY | Facility: CLINIC | Age: 34
End: 2024-02-13
Payer: COMMERCIAL

## 2024-02-19 ENCOUNTER — APPOINTMENT (OUTPATIENT)
Dept: OTOLARYNGOLOGY | Facility: CLINIC | Age: 34
End: 2024-02-19
Payer: COMMERCIAL

## 2024-03-20 ENCOUNTER — APPOINTMENT (OUTPATIENT)
Dept: AUDIOLOGY | Facility: CLINIC | Age: 34
End: 2024-03-20
Payer: COMMERCIAL

## 2024-04-08 ENCOUNTER — OFFICE VISIT (OUTPATIENT)
Dept: OTOLARYNGOLOGY | Facility: CLINIC | Age: 34
End: 2024-04-08
Payer: COMMERCIAL

## 2024-04-08 VITALS
DIASTOLIC BLOOD PRESSURE: 73 MMHG | WEIGHT: 155.4 LBS | HEIGHT: 62 IN | RESPIRATION RATE: 16 BRPM | SYSTOLIC BLOOD PRESSURE: 117 MMHG | TEMPERATURE: 98.3 F | HEART RATE: 93 BPM | BODY MASS INDEX: 28.6 KG/M2 | OXYGEN SATURATION: 98 %

## 2024-04-08 DIAGNOSIS — H61.22 EXCESSIVE CERUMEN IN LEFT EAR CANAL: Primary | ICD-10-CM

## 2024-04-08 DIAGNOSIS — H72.92 PERFORATION OF LEFT TYMPANIC MEMBRANE: ICD-10-CM

## 2024-04-08 DIAGNOSIS — H90.A31 MIXED CONDUCTIVE AND SENSORINEURAL HEARING LOSS OF RIGHT EAR WITH RESTRICTED HEARING OF LEFT EAR: ICD-10-CM

## 2024-04-08 DIAGNOSIS — Z96.21 STATUS POST PLACEMENT OF BONE ANCHORED HEARING AID (BAHA): ICD-10-CM

## 2024-04-08 DIAGNOSIS — H90.A12 CONDUCTIVE HEARING LOSS OF LEFT EAR WITH RESTRICTED HEARING OF RIGHT EAR: ICD-10-CM

## 2024-04-08 PROCEDURE — 99213 OFFICE O/P EST LOW 20 MIN: CPT | Performed by: NURSE PRACTITIONER

## 2024-04-08 SDOH — ECONOMIC STABILITY: FOOD INSECURITY: WITHIN THE PAST 12 MONTHS, THE FOOD YOU BOUGHT JUST DIDN'T LAST AND YOU DIDN'T HAVE MONEY TO GET MORE.: NEVER TRUE

## 2024-04-08 SDOH — ECONOMIC STABILITY: FOOD INSECURITY: WITHIN THE PAST 12 MONTHS, YOU WORRIED THAT YOUR FOOD WOULD RUN OUT BEFORE YOU GOT MONEY TO BUY MORE.: NEVER TRUE

## 2024-04-08 ASSESSMENT — ENCOUNTER SYMPTOMS
OCCASIONAL FEELINGS OF UNSTEADINESS: 0
DEPRESSION: 0
LOSS OF SENSATION IN FEET: 0

## 2024-04-08 ASSESSMENT — LIFESTYLE VARIABLES
HOW OFTEN DO YOU HAVE SIX OR MORE DRINKS ON ONE OCCASION: NEVER
SKIP TO QUESTIONS 9-10: 1
HOW MANY STANDARD DRINKS CONTAINING ALCOHOL DO YOU HAVE ON A TYPICAL DAY: 1 OR 2
AUDIT-C TOTAL SCORE: 1
HOW OFTEN DO YOU HAVE A DRINK CONTAINING ALCOHOL: MONTHLY OR LESS

## 2024-04-08 ASSESSMENT — PAIN SCALES - GENERAL: PAINLEVEL: 0-NO PAIN

## 2024-04-08 NOTE — PROGRESS NOTES
Subjective   Patient ID: Peng Jovel is a 34 y.o. female who presents for Follow-up (Ear drainage).    HPI  Last saw Dr. Whiteside on 1/2/2024.  Here for routine ear cleaning and follow up.   Possible wax in the left ear. Overall no complaints. No drainage.     Past Medical History:   Diagnosis Date    Acute upper respiratory infection, unspecified 01/29/2017    Viral upper respiratory tract infection    Conductive hearing loss, bilateral 06/15/2021    Conductive hearing loss, bilateral    Hydronephrosis 04/25/2023    Kidney stone     Left ureteral stone 04/25/2023    Microtia 06/15/2021    Microtia    Myopia, bilateral 06/07/2021    Bilateral myopia    Myopia, bilateral 06/07/2021    Myopia of both eyes with regular astigmatism    Other congenital deformities of skull, face and jaw     Hemifacial microsomia    Other disturbances of smell and taste 12/31/2019    Decreased sense of smell    Other symptoms and signs involving general sensations and perceptions 12/31/2019    Facial pressure    Otitis media, unspecified, left ear 01/11/2022    Acute left otitis media    Otitis media, unspecified, unspecified ear 06/15/2021    Ear infection    Otorrhea, left ear 06/15/2021    Otorrhea of left ear    Perforation of left tympanic membrane 04/25/2023    Personal history of (corrected) cleft lip and palate 09/02/2014    History of cleft palate with cleft lip    Personal history of other complications of pregnancy, childbirth and the puerperium 01/13/2022    History of pyelonephritis during pregnancy    Personal history of other diseases of the nervous system and sense organs     History of drainage from eye    Personal history of other diseases of the respiratory system     History of sore throat    Personal history of other diseases of the respiratory system 11/07/2017    History of paranasal sinus congestion    Personal history of other diseases of the respiratory system 12/31/2019    History of acute sinusitis     Personal history of other diseases of the respiratory system 2019    History of nasal discharge    Personal history of other diseases of the respiratory system 2019    History of sinusitis    Personal history of other diseases of the respiratory system 2020    History of chronic sinusitis    Personal history of other specified conditions 2019    History of facial pain    Personal history of other specified conditions 2020    History of nasal congestion    Personal history of other specified conditions 2020    History of nasal congestion    Sneezing 2020    Sneezing with watery eyes    Unspecified conjunctivitis 2017    Right conjunctivitis    Unspecified otitis externa, left ear 2021    Left otitis externa    Unspecified otitis externa, left ear 2016    Left otitis externa    Urinary tract infection, site not specified 2022    Bacterial UTI      Past Surgical History:   Procedure Laterality Date    CERVICAL BIOPSY  W/ LOOP ELECTRODE EXCISION  2022     SECTION, LOW TRANSVERSE  2022    IR NEPHROSTOMY TUBE EXCHANGE  12/15/2021    IR NEPHROSTOMY TUBE EXCHANGE 12/15/2021 [facility]    MANDIBLE SURGERY  2014    Jaw Surgery    NOSE SURGERY  2014    Nose Surgery    OTHER SURGICAL HISTORY  2014    Biopsy Soft Palate Midline      Review of Systems   All other systems reviewed and are negative.    Objective   Physical Exam    Right Ear: Atresia with prosthesis. [facility].     Left Ear: External inspection of ear with no deformity, scars, or masses. EAC is with some non occluding cerumen that was removed using the microscope and suction .  TM with perforation about 60%. Some mucosalization of the superior aspect of the remnant TM. Middle ear is dry, no evidence of infection or cholesteatoma.     Assessment/Plan     -Return to clinic in 6 months for follow up.    All questions answered to patient satisfaction.

## 2024-04-23 ENCOUNTER — APPOINTMENT (OUTPATIENT)
Dept: OTOLARYNGOLOGY | Facility: CLINIC | Age: 34
End: 2024-04-23
Payer: COMMERCIAL

## 2024-04-29 ENCOUNTER — CLINICAL SUPPORT (OUTPATIENT)
Dept: AUDIOLOGY | Facility: CLINIC | Age: 34
End: 2024-04-29
Payer: COMMERCIAL

## 2024-04-29 DIAGNOSIS — H90.A12 CONDUCTIVE HEARING LOSS OF LEFT EAR WITH RESTRICTED HEARING OF RIGHT EAR: Primary | ICD-10-CM

## 2024-04-29 DIAGNOSIS — H90.A31 MIXED CONDUCTIVE AND SENSORINEURAL HEARING LOSS OF RIGHT EAR WITH RESTRICTED HEARING OF LEFT EAR: ICD-10-CM

## 2024-04-29 PROCEDURE — V5264 EAR MOLD/INSERT: HCPCS | Mod: LT | Performed by: AUDIOLOGIST

## 2024-04-29 NOTE — PROGRESS NOTES
"HEARING AID CHECK    Name:  Peng Jovel  :  1990  Age:  34 y.o.  Date of Evaluation:  2024    HISTORY  Patient seen today for a hearing aid check. She reported that the sound quality in her left hearing aid is poor, which she believes is due to needing a new ear mold.     Right ear: (Cochlear)SN: [id number]  Left ear: Sofie W63-VSLX: [id number]  Hearing aid under warranty until 2022\"     Otoscopic inspection indicated clear ear canals and visualization of the tympanic membrane on the left. Ear mold impression was taken without incidence. Ear mold impression will be sent to [facility]. Return in 3-4 weeks for updated hearing test and ear mold fitting. Ear mold billed today.      TREATMENT PLAN:  Follow up with Dr. Whiteside as recommended  Annual hearing assessment or sooner if changes or new symptoms arise  Continue full time use of bilateral hearing devices  Contact clinic with questions or concerns at [phone]    Time: 1571-0073    Completed by:  FLORENTIN Azul.  Doctor of Audiology Extern    Luciano Gaytan, CCC-A  Licensed Audiologist        "

## 2024-05-28 ENCOUNTER — APPOINTMENT (OUTPATIENT)
Dept: PRIMARY CARE | Facility: CLINIC | Age: 34
End: 2024-05-28
Payer: COMMERCIAL

## 2024-05-30 ENCOUNTER — OFFICE VISIT (OUTPATIENT)
Dept: OTOLARYNGOLOGY | Facility: CLINIC | Age: 34
End: 2024-05-30
Payer: COMMERCIAL

## 2024-05-30 VITALS
HEART RATE: 96 BPM | DIASTOLIC BLOOD PRESSURE: 76 MMHG | TEMPERATURE: 97.8 F | HEIGHT: 62 IN | SYSTOLIC BLOOD PRESSURE: 121 MMHG | BODY MASS INDEX: 28.34 KG/M2 | WEIGHT: 154 LBS

## 2024-05-30 DIAGNOSIS — H91.22 SUDDEN HEARING LOSS, LEFT: Primary | ICD-10-CM

## 2024-05-30 PROCEDURE — 99213 OFFICE O/P EST LOW 20 MIN: CPT | Performed by: NURSE PRACTITIONER

## 2024-05-30 PROCEDURE — 1036F TOBACCO NON-USER: CPT | Performed by: NURSE PRACTITIONER

## 2024-05-30 ASSESSMENT — PAIN SCALES - GENERAL: PAINLEVEL: 0-NO PAIN

## 2024-05-30 ASSESSMENT — ENCOUNTER SYMPTOMS
DEPRESSION: 0
OCCASIONAL FEELINGS OF UNSTEADINESS: 0
LOSS OF SENSATION IN FEET: 0

## 2024-05-30 NOTE — PROGRESS NOTES
Subjective   Patient ID: Pegn Jovel is a 34 y.o. female who presents for Ear Drainage.    Ear Drainage     Last visit with me on 4/8/2024.  Last saw Dr. Whiteside on 1/2/2024. Had upcoming appt but it was cancelled.  Patient states this morning she woke up and felt her hearing was significantly decreased in the left ear.  She thought maybe she had wax in the ear.  She denies having any ear drainage or pain.    Past Medical History:   Diagnosis Date    Acute upper respiratory infection, unspecified 01/29/2017    Viral upper respiratory tract infection    Conductive hearing loss, bilateral 06/15/2021    Conductive hearing loss, bilateral    Hydronephrosis 04/25/2023    Kidney stone     Left ureteral stone 04/25/2023    Microtia 06/15/2021    Microtia    Myopia, bilateral 06/07/2021    Bilateral myopia    Myopia, bilateral 06/07/2021    Myopia of both eyes with regular astigmatism    Other congenital deformities of skull, face and jaw     Hemifacial microsomia    Other disturbances of smell and taste 12/31/2019    Decreased sense of smell    Other symptoms and signs involving general sensations and perceptions 12/31/2019    Facial pressure    Otitis media, unspecified, left ear 01/11/2022    Acute left otitis media    Otitis media, unspecified, unspecified ear 06/15/2021    Ear infection    Otorrhea, left ear 06/15/2021    Otorrhea of left ear    Perforation of left tympanic membrane 04/25/2023    Personal history of (corrected) cleft lip and palate 09/02/2014    History of cleft palate with cleft lip    Personal history of other complications of pregnancy, childbirth and the puerperium 01/13/2022    History of pyelonephritis during pregnancy    Personal history of other diseases of the nervous system and sense organs     History of drainage from eye    Personal history of other diseases of the respiratory system     History of sore throat    Personal history of other diseases of the respiratory system 11/07/2017     History of paranasal sinus congestion    Personal history of other diseases of the respiratory system 2019    History of acute sinusitis    Personal history of other diseases of the respiratory system 2019    History of nasal discharge    Personal history of other diseases of the respiratory system 2019    History of sinusitis    Personal history of other diseases of the respiratory system 2020    History of chronic sinusitis    Personal history of other specified conditions 2019    History of facial pain    Personal history of other specified conditions 2020    History of nasal congestion    Personal history of other specified conditions 2020    History of nasal congestion    Sneezing 2020    Sneezing with watery eyes    Unspecified conjunctivitis 2017    Right conjunctivitis    Unspecified otitis externa, left ear 2021    Left otitis externa    Unspecified otitis externa, left ear 2016    Left otitis externa    Urinary tract infection, site not specified 2022    Bacterial UTI      Past Surgical History:   Procedure Laterality Date    CERVICAL BIOPSY  W/ LOOP ELECTRODE EXCISION  2022     SECTION, LOW TRANSVERSE  2022    IR NEPHROSTOMY TUBE EXCHANGE  12/15/2021    IR NEPHROSTOMY TUBE EXCHANGE 12/15/2021 [facility]    MANDIBLE SURGERY  2014    Jaw Surgery    NOSE SURGERY  2014    Nose Surgery    OTHER SURGICAL HISTORY  2014    Biopsy Soft Palate Midline      Review of Systems   All other systems reviewed and are negative.    Objective   Physical Exam    Right Ear: Atresia with prosthesis. [facility] site WNL.     Left Ear: External inspection of ear with no deformity, scars, or masses. EAC is with some non occluding cerumen that was removed using the microscope and suction .  TM with perforation about 60%. Some mucosalization of the superior aspect of the remnant TM. Middle ear is dry, no evidence  of infection or cholesteatoma.     Assessment/Plan   Reassurance given that there is no cerumen impaction or evidence of infection on exam.  She does have an audiogram scheduled for 6/11, but due to the sudden onset of hearing loss in the left ear I would recommend updating audiogram sooner and I will follow-up with the results.  I have reached out to the audiology team to get her scheduled sooner.    All questions answered to patient satisfaction.

## 2024-05-31 ENCOUNTER — APPOINTMENT (OUTPATIENT)
Dept: OTOLARYNGOLOGY | Facility: CLINIC | Age: 34
End: 2024-05-31
Payer: COMMERCIAL

## 2024-06-04 ENCOUNTER — APPOINTMENT (OUTPATIENT)
Dept: OTOLARYNGOLOGY | Facility: CLINIC | Age: 34
End: 2024-06-04
Payer: COMMERCIAL

## 2024-06-07 ENCOUNTER — APPOINTMENT (OUTPATIENT)
Dept: PRIMARY CARE | Facility: CLINIC | Age: 34
End: 2024-06-07
Payer: COMMERCIAL

## 2024-06-11 ENCOUNTER — CLINICAL SUPPORT (OUTPATIENT)
Dept: AUDIOLOGY | Facility: CLINIC | Age: 34
End: 2024-06-11
Payer: COMMERCIAL

## 2024-06-11 DIAGNOSIS — H90.A12 CONDUCTIVE HEARING LOSS OF LEFT EAR WITH RESTRICTED HEARING OF RIGHT EAR: Primary | ICD-10-CM

## 2024-06-11 DIAGNOSIS — H90.A31 MIXED CONDUCTIVE AND SENSORINEURAL HEARING LOSS OF RIGHT EAR WITH RESTRICTED HEARING OF LEFT EAR: ICD-10-CM

## 2024-06-11 PROCEDURE — 92557 COMPREHENSIVE HEARING TEST: CPT | Performed by: AUDIOLOGIST

## 2024-06-11 PROCEDURE — 92567 TYMPANOMETRY: CPT | Mod: LT | Performed by: AUDIOLOGIST

## 2024-06-11 NOTE — PROGRESS NOTES
"Follow-Up Visit    Name:  Peng Jovel  :  1990  Age:  34 y.o.  Date of Evaluation:  2024    HISTORY  Peng, 34 years old, was seen for an updated hearing test and ear mold . She recently had a decrease in her left ear but noted moisture in the tubing. Hearing sensitivity returned once tubing was cleaned.    Right ear: (Cochlear)SN: [id number]  Left ear: Bolero N84-VVUK: [id number]  Hearing aid under warranty until 2022\"     Evaluation:  Otoscopy revealed clear canal with visible tympanic membrane on the left. Microtia and prosthetic on the right.    Immittance:  Right: CNT -- Microtia and prosthetic  Left: Type B middle ear function with normal ear canal volume, consistent with previous testing    Behavioral Audiometry:  Right: profound rising to moderately-severe mixed hearing loss 125-8000 Hz. Excellent word understanding (96 %) at 95 dB HL.  Left:  severe rising to mild conductive hearing loss 125-8000 Hz. Excellent word understanding (96 %) at 85 dB HL.    Pure tone averages in agreement with speech reception thresholds.    Results:  Today's results were discussed with the patient indicating profound rising to moderately-severe mixed hearing loss in the right ear and  severe rising to mild conductive hearing loss in the left ear. Stable results.    Ear mold coupled to hearing aid and fit appropriately.    TREATMENT PLAN:  Follow up with Dr. Whiteside as recommended  Annual hearing assessment or sooner if changes or new symptoms arise  Continue full time use of bilateral hearing devices  Contact clinic with questions or concerns at [phone]    Time: 673-110    Luciano Gaytan, CCC-A  Licensed Senior Audiologist        "

## 2024-09-18 ENCOUNTER — APPOINTMENT (OUTPATIENT)
Dept: OTOLARYNGOLOGY | Facility: CLINIC | Age: 34
End: 2024-09-18
Payer: COMMERCIAL

## 2024-09-23 ENCOUNTER — APPOINTMENT (OUTPATIENT)
Dept: OTOLARYNGOLOGY | Facility: CLINIC | Age: 34
End: 2024-09-23
Payer: COMMERCIAL

## 2024-10-16 ENCOUNTER — APPOINTMENT (OUTPATIENT)
Dept: OTOLARYNGOLOGY | Facility: CLINIC | Age: 34
End: 2024-10-16
Payer: COMMERCIAL

## 2024-10-21 ENCOUNTER — APPOINTMENT (OUTPATIENT)
Dept: OTOLARYNGOLOGY | Facility: CLINIC | Age: 34
End: 2024-10-21
Payer: COMMERCIAL

## 2024-11-11 ENCOUNTER — APPOINTMENT (OUTPATIENT)
Dept: OTOLARYNGOLOGY | Facility: CLINIC | Age: 34
End: 2024-11-11
Payer: COMMERCIAL

## 2025-01-02 ENCOUNTER — APPOINTMENT (OUTPATIENT)
Dept: OBSTETRICS AND GYNECOLOGY | Facility: CLINIC | Age: 35
End: 2025-01-02
Payer: COMMERCIAL

## 2025-01-03 ENCOUNTER — APPOINTMENT (OUTPATIENT)
Dept: PRIMARY CARE | Facility: CLINIC | Age: 35
End: 2025-01-03
Payer: COMMERCIAL

## 2025-01-20 ENCOUNTER — APPOINTMENT (OUTPATIENT)
Dept: PRIMARY CARE | Facility: CLINIC | Age: 35
End: 2025-01-20
Payer: COMMERCIAL

## 2025-01-30 ENCOUNTER — APPOINTMENT (OUTPATIENT)
Dept: OBSTETRICS AND GYNECOLOGY | Facility: CLINIC | Age: 35
End: 2025-01-30
Payer: COMMERCIAL

## 2025-02-04 ENCOUNTER — APPOINTMENT (OUTPATIENT)
Dept: OTOLARYNGOLOGY | Facility: CLINIC | Age: 35
End: 2025-02-04
Payer: COMMERCIAL

## 2025-02-14 ENCOUNTER — APPOINTMENT (OUTPATIENT)
Dept: PRIMARY CARE | Facility: CLINIC | Age: 35
End: 2025-02-14
Payer: COMMERCIAL

## 2025-02-26 ENCOUNTER — APPOINTMENT (OUTPATIENT)
Dept: OTOLARYNGOLOGY | Facility: CLINIC | Age: 35
End: 2025-02-26
Payer: COMMERCIAL

## 2025-03-07 ENCOUNTER — CLINICAL SUPPORT (OUTPATIENT)
Dept: AUDIOLOGY | Facility: CLINIC | Age: 35
End: 2025-03-07

## 2025-03-07 DIAGNOSIS — H90.A12 CONDUCTIVE HEARING LOSS OF LEFT EAR WITH RESTRICTED HEARING OF RIGHT EAR: Primary | ICD-10-CM

## 2025-03-07 PROCEDURE — V5264 EAR MOLD/INSERT: HCPCS | Mod: LT,AUDSP | Performed by: AUDIOLOGIST

## 2025-03-07 NOTE — PROGRESS NOTES
"Follow-Up Visit    Name:  Peng Jovel  :  1990  Age:  35 y.o.  Date of Evaluation:  3/7/2025    HISTORY  Peng, 35 years old, was seen for an ear mold impression.     Right ear: (Cochlear)SN: [id number]  Left ear: Bolero H03-SHOU: [id number]  Hearing aid under warranty until 2022\"     Ear Mold Impression:  Ear mold impression for the left ear was taken without incidence. Ear mold will be sent to  for make. Follow-up in 2-3 weeks for ear mold .    TREATMENT PLAN:  Follow up with Dr. Whiteside as recommended  Annual hearing assessment or sooner if changes or new symptoms arise  Continue full time use of bilateral hearing devices  Contact clinic with questions or concerns at [phone]    Time: 4856-5207    Luciano Gaytan, CCC-A  Licensed Senior Audiologist        "

## 2025-03-25 ENCOUNTER — APPOINTMENT (OUTPATIENT)
Dept: OTOLARYNGOLOGY | Facility: CLINIC | Age: 35
End: 2025-03-25
Payer: COMMERCIAL

## 2025-03-25 ENCOUNTER — CLINICAL SUPPORT (OUTPATIENT)
Dept: AUDIOLOGY | Facility: CLINIC | Age: 35
End: 2025-03-25
Payer: COMMERCIAL

## 2025-03-25 DIAGNOSIS — H90.A12 CONDUCTIVE HEARING LOSS OF LEFT EAR WITH RESTRICTED HEARING OF RIGHT EAR: Primary | ICD-10-CM

## 2025-03-25 DIAGNOSIS — H90.A31 MIXED CONDUCTIVE AND SENSORINEURAL HEARING LOSS OF RIGHT EAR WITH RESTRICTED HEARING OF LEFT EAR: ICD-10-CM

## 2025-03-25 NOTE — PROGRESS NOTES
"Follow-Up Visit    Name:  Peng Jovel  :  1990  Age:  35 y.o.  Date of Evaluation:  3/25/2025    HISTORY  Peng, 35 years old, was seen for an ear mold .     Right ear: (Cochlear)SN: [id number]  Left ear: Bolero M52-MQKH: [id number]  Hearing aid under warranty until 2022\"     Ear Mold Impression:  Ear mold fit appropriately and coupled to hearing aid. Ear mold billed on 3/7/25    TREATMENT PLAN:  Follow up with Dr. Whiteside as recommended  Annual hearing assessment or sooner if changes or new symptoms arise  Continue full time use of bilateral hearing devices  Contact clinic with questions or concerns at [phone]    Time: 6818-2998    Luciano Gaytan, CCC-A  Licensed Senior Audiologist        "

## 2025-03-26 ENCOUNTER — OFFICE VISIT (OUTPATIENT)
Dept: OBSTETRICS AND GYNECOLOGY | Facility: CLINIC | Age: 35
End: 2025-03-26
Payer: COMMERCIAL

## 2025-03-26 ENCOUNTER — APPOINTMENT (OUTPATIENT)
Dept: OTOLARYNGOLOGY | Facility: CLINIC | Age: 35
End: 2025-03-26
Payer: COMMERCIAL

## 2025-03-26 ENCOUNTER — APPOINTMENT (OUTPATIENT)
Dept: OBSTETRICS AND GYNECOLOGY | Facility: CLINIC | Age: 35
End: 2025-03-26
Payer: COMMERCIAL

## 2025-03-26 VITALS
SYSTOLIC BLOOD PRESSURE: 118 MMHG | WEIGHT: 151 LBS | HEIGHT: 62 IN | BODY MASS INDEX: 27.79 KG/M2 | DIASTOLIC BLOOD PRESSURE: 70 MMHG

## 2025-03-26 DIAGNOSIS — Z12.4 CERVICAL CANCER SCREENING: ICD-10-CM

## 2025-03-26 DIAGNOSIS — Z01.419 WELL WOMAN EXAM: Primary | ICD-10-CM

## 2025-03-26 DIAGNOSIS — G43.119 INTRACTABLE MIGRAINE WITH AURA WITHOUT STATUS MIGRAINOSUS: ICD-10-CM

## 2025-03-26 PROCEDURE — 87624 HPV HI-RISK TYP POOLED RSLT: CPT

## 2025-03-26 PROCEDURE — 3008F BODY MASS INDEX DOCD: CPT | Performed by: OBSTETRICS & GYNECOLOGY

## 2025-03-26 PROCEDURE — 1036F TOBACCO NON-USER: CPT | Performed by: OBSTETRICS & GYNECOLOGY

## 2025-03-26 PROCEDURE — 99395 PREV VISIT EST AGE 18-39: CPT | Performed by: OBSTETRICS & GYNECOLOGY

## 2025-03-26 ASSESSMENT — PAIN SCALES - GENERAL: PAINLEVEL_OUTOF10: 0-NO PAIN

## 2025-03-26 NOTE — PROGRESS NOTES
"Peng Jovel is a 35 y.o. here for well woman visit  HPI: HEALTH IS GOOD.   No concerns.   Rubio is good.  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 She is normal weight.   Neck:      Thyroid: No thyroid mass or thyromegaly.   Pulmonary:      Effort: Pulmonary effort is normal.   Chest:   Breasts:     Right: Normal.      Left: Normal.   Abdominal:      Palpations: Abdomen is soft.   Genitourinary:     General: Normal vulva.      Exam position: Lithotomy position.      Vagina: Normal.      Cervix: Normal.      Uterus: Normal.       Adnexa: Right adnexa normal and left adnexa normal.   Musculoskeletal:         General: Normal range of motion.      Cervical back: Normal range of motion and neck supple.   Lymphadenopathy:      Upper Body:      Right upper body: No supraclavicular or axillary adenopathy.      Left upper body: No supraclavicular or axillary adenopathy.   Skin:     General: Skin is warm and dry.   Neurological:      General: No focal deficit present.      Mental Status: She is alert.   Psychiatric:         Mood and Affect: Mood normal.         Behavior: Behavior normal.         Thought Content: Thought content normal.         Judgment: Judgment normal.          Assessment and Plan:  Problem List Items Addressed This Visit          Ob-Gyn Problems    Well woman exam - Primary    Overview     Thank you for your visit for well woman care.  At your visit, you had a pap smear performed. The results will be available in [facility] in two to three " weeks.  You are happy with your current birth control. Let me know if you change your mind or have questions.           Other Visit Diagnoses       Intractable migraine with aura without status migrainosus        Relevant Medications    drospirenone, contraceptive, 4 mg (28) tablet    Cervical cancer screening        Relevant Orders    THINPREP PAP TEST           No orders of the defined types were placed in this encounter.

## 2025-04-01 ENCOUNTER — APPOINTMENT (OUTPATIENT)
Dept: PODIATRY | Facility: CLINIC | Age: 35
End: 2025-04-01
Payer: COMMERCIAL

## 2025-04-02 ENCOUNTER — APPOINTMENT (OUTPATIENT)
Dept: OBSTETRICS AND GYNECOLOGY | Facility: CLINIC | Age: 35
End: 2025-04-02
Payer: COMMERCIAL

## 2025-04-09 ENCOUNTER — TELEPHONE (OUTPATIENT)
Dept: OBSTETRICS AND GYNECOLOGY | Facility: CLINIC | Age: 35
End: 2025-04-09
Payer: COMMERCIAL

## 2025-04-09 ENCOUNTER — TELEPHONE (OUTPATIENT)
Dept: OBSTETRICS AND GYNECOLOGY | Facility: CLINIC | Age: 35
End: 2025-04-09

## 2025-04-09 NOTE — TELEPHONE ENCOUNTER
----- Message from Mayi Ayala sent at 4/4/2025  4:11 PM EDT -----  Regarding: Call the patient and let her know the results, please.  Call the patient and let her know the results, please.  ----- Message -----  From: Lab, Background User  Sent: 4/4/2025   3:16 PM EDT  To: Mayi Ayala MD

## 2025-04-09 NOTE — TELEPHONE ENCOUNTER
Attempted to reach pt by phone to provide her with abnl pap result and plan of care per dr morris.  Got her voice mail.  Message left to call office.

## 2025-04-09 NOTE — TELEPHONE ENCOUNTER
Pt returned call to office.  Pt saw her pap result online already.  Pt states she went ahead and scheduled the procedure for 5/14/25.  Pt has had a colposcopy before.

## 2025-04-11 ENCOUNTER — OFFICE VISIT (OUTPATIENT)
Dept: PRIMARY CARE | Facility: CLINIC | Age: 35
End: 2025-04-11
Payer: COMMERCIAL

## 2025-04-11 VITALS
DIASTOLIC BLOOD PRESSURE: 65 MMHG | SYSTOLIC BLOOD PRESSURE: 101 MMHG | HEART RATE: 76 BPM | BODY MASS INDEX: 28.37 KG/M2 | TEMPERATURE: 98.3 F | WEIGHT: 154.2 LBS | RESPIRATION RATE: 14 BRPM | OXYGEN SATURATION: 95 % | HEIGHT: 62 IN

## 2025-04-11 DIAGNOSIS — Z00.00 ROUTINE GENERAL MEDICAL EXAMINATION AT A HEALTH CARE FACILITY: Primary | ICD-10-CM

## 2025-04-11 DIAGNOSIS — Z13.29 SCREENING FOR THYROID DISORDER: ICD-10-CM

## 2025-04-11 DIAGNOSIS — Z13.220 SCREENING FOR LIPID DISORDERS: ICD-10-CM

## 2025-04-11 DIAGNOSIS — H90.A12 CONDUCTIVE HEARING LOSS OF LEFT EAR WITH RESTRICTED HEARING OF RIGHT EAR: ICD-10-CM

## 2025-04-11 DIAGNOSIS — R87.611 PAPANICOLAOU SMEAR OF CERVIX WITH ATYPICAL SQUAMOUS CELLS CANNOT EXCLUDE HIGH GRADE SQUAMOUS INTRAEPITHELIAL LESION (ASC-H): ICD-10-CM

## 2025-04-11 PROBLEM — H40.003 GLAUCOMA SUSPECT OF BOTH EYES: Status: RESOLVED | Noted: 2023-10-03 | Resolved: 2025-04-11

## 2025-04-11 PROCEDURE — 3008F BODY MASS INDEX DOCD: CPT | Performed by: INTERNAL MEDICINE

## 2025-04-11 PROCEDURE — 99395 PREV VISIT EST AGE 18-39: CPT | Performed by: INTERNAL MEDICINE

## 2025-04-11 PROCEDURE — 1036F TOBACCO NON-USER: CPT | Performed by: INTERNAL MEDICINE

## 2025-04-11 ASSESSMENT — ENCOUNTER SYMPTOMS
WEAKNESS: 0
FEVER: 0
TREMORS: 0
SLEEP DISTURBANCE: 0
FREQUENCY: 0
SPEECH DIFFICULTY: 0
FLANK PAIN: 0
BACK PAIN: 0
DYSURIA: 0
DIZZINESS: 0
ACTIVITY CHANGE: 0
DYSPHORIC MOOD: 0
FATIGUE: 0
PHOTOPHOBIA: 0
SINUS PRESSURE: 0
DECREASED CONCENTRATION: 0
POLYDIPSIA: 0
EYE PAIN: 0
MYALGIAS: 0
FACIAL SWELLING: 0
HYPERACTIVE: 0
BLOOD IN STOOL: 0
COUGH: 0
SINUS PAIN: 0
HALLUCINATIONS: 0
NECK PAIN: 0
COLOR CHANGE: 0
CHEST TIGHTNESS: 0
SHORTNESS OF BREATH: 0
CONSTIPATION: 0
TROUBLE SWALLOWING: 0
SORE THROAT: 0
DIAPHORESIS: 0
CHOKING: 0
EYE DISCHARGE: 0
CHILLS: 0
LIGHT-HEADEDNESS: 0
PALPITATIONS: 0
POLYPHAGIA: 0
NAUSEA: 0
VOMITING: 0
EYE ITCHING: 0
ADENOPATHY: 0
RHINORRHEA: 0
NUMBNESS: 0
EYE REDNESS: 0
ARTHRALGIAS: 0
NECK STIFFNESS: 0
HEADACHES: 0
HEMATURIA: 0
UNEXPECTED WEIGHT CHANGE: 0
APNEA: 0
FACIAL ASYMMETRY: 0
ABDOMINAL DISTENTION: 0
ABDOMINAL PAIN: 0
CONFUSION: 0
RECTAL PAIN: 0
ANAL BLEEDING: 0
DIFFICULTY URINATING: 0
DIARRHEA: 0
NERVOUS/ANXIOUS: 0
STRIDOR: 0
WHEEZING: 0
WOUND: 0
AGITATION: 0
VOICE CHANGE: 0
APPETITE CHANGE: 0
SEIZURES: 0
BRUISES/BLEEDS EASILY: 0
JOINT SWELLING: 0

## 2025-04-11 ASSESSMENT — PATIENT HEALTH QUESTIONNAIRE - PHQ9
1. LITTLE INTEREST OR PLEASURE IN DOING THINGS: NOT AT ALL
2. FEELING DOWN, DEPRESSED OR HOPELESS: NOT AT ALL
SUM OF ALL RESPONSES TO PHQ9 QUESTIONS 1 AND 2: 0

## 2025-04-11 NOTE — PROGRESS NOTES
Subjective   Patient ID: Peng Jovel is a 35 y.o. female who presents for Annual Exam.    HPI  Pt here for full physical.  She is trying to exercise but hopes to do more once weather improves.  Diet is fair.      She did see GYN and had an abnormal pap end of March.  She is scheduled for colposcopy in May.  She has had this before after birth of her son.  No pelvic issues.      She is doing well otherwise.        Review of Systems   Constitutional:  Negative for activity change, appetite change, chills, diaphoresis, fatigue, fever and unexpected weight change.   HENT:  Negative for congestion, dental problem, drooling, ear discharge, ear pain, facial swelling, hearing loss, mouth sores, nosebleeds, postnasal drip, rhinorrhea, sinus pressure, sinus pain, sneezing, sore throat, tinnitus, trouble swallowing and voice change.    Eyes:  Negative for photophobia, pain, discharge, redness, itching and visual disturbance (wears glasses-up to date on exam).   Respiratory:  Negative for apnea, cough, choking, chest tightness, shortness of breath, wheezing and stridor.    Cardiovascular:  Negative for chest pain, palpitations and leg swelling.   Gastrointestinal:  Negative for abdominal distention, abdominal pain, anal bleeding, blood in stool, constipation, diarrhea, nausea, rectal pain and vomiting.   Endocrine: Negative for cold intolerance, heat intolerance, polydipsia, polyphagia and polyuria.   Genitourinary:  Negative for decreased urine volume, difficulty urinating, dyspareunia, dysuria, enuresis, flank pain, frequency, genital sores, hematuria, menstrual problem, pelvic pain, urgency, vaginal bleeding, vaginal discharge and vaginal pain.   Musculoskeletal:  Negative for arthralgias, back pain, gait problem, joint swelling, myalgias, neck pain and neck stiffness.   Skin:  Negative for color change, pallor, rash and wound.   Allergic/Immunologic: Negative for environmental allergies, food allergies and  "immunocompromised state.   Neurological:  Negative for dizziness, tremors, seizures, syncope, facial asymmetry, speech difficulty, weakness, light-headedness, numbness and headaches.   Hematological:  Negative for adenopathy. Does not bruise/bleed easily.   Psychiatric/Behavioral:  Negative for agitation, behavioral problems, confusion, decreased concentration, dysphoric mood, hallucinations, self-injury, sleep disturbance and suicidal ideas. The patient is not nervous/anxious and is not hyperactive.        Objective   /65 (BP Location: Right arm, Patient Position: Sitting)   Pulse 76   Temp 36.8 °C (98.3 °F) (Oral)   Resp 14   Ht 1.562 m (5' 1.5\")   Wt 69.9 kg (154 lb 3.2 oz)   LMP 03/07/2025   SpO2 95%   BMI 28.66 kg/m²    Physical Exam  Constitutional:       Appearance: Normal appearance.   HENT:      Head: Normocephalic and atraumatic.      Comments: Facial distortion/scaring from previous surgeries/reconstruction      Left Ear: Tympanic membrane, ear canal and external ear normal. There is no impacted cerumen.      Ears:      Comments: Closed ear canal from reconstruction  Hearing aid in left ear     Nose: Nose normal. No congestion or rhinorrhea.      Mouth/Throat:      Mouth: Mucous membranes are moist.      Pharynx: Oropharynx is clear. No oropharyngeal exudate or posterior oropharyngeal erythema.   Eyes:      Extraocular Movements: Extraocular movements intact.      Conjunctiva/sclera: Conjunctivae normal.      Pupils: Pupils are equal, round, and reactive to light.   Neck:      Vascular: No carotid bruit.   Cardiovascular:      Rate and Rhythm: Normal rate and regular rhythm.      Pulses: Normal pulses.      Heart sounds: Normal heart sounds. No murmur heard.  Pulmonary:      Effort: Pulmonary effort is normal. No respiratory distress.      Breath sounds: Normal breath sounds. No wheezing, rhonchi or rales.   Abdominal:      General: Abdomen is flat. Bowel sounds are normal. There is no " distension.      Palpations: Abdomen is soft.      Tenderness: There is no abdominal tenderness.      Hernia: No hernia is present.   Musculoskeletal:         General: No swelling or tenderness. Normal range of motion.      Cervical back: Normal range of motion and neck supple.      Right lower leg: No edema.      Left lower leg: No edema.   Lymphadenopathy:      Cervical: No cervical adenopathy.   Skin:     General: Skin is warm and dry.      Findings: No lesion or rash.   Neurological:      General: No focal deficit present.      Mental Status: She is alert and oriented to person, place, and time.      Cranial Nerves: No cranial nerve deficit.      Sensory: No sensory deficit.      Motor: No weakness.   Psychiatric:         Mood and Affect: Mood normal.         Behavior: Behavior normal.         Thought Content: Thought content normal.         Judgment: Judgment normal.         Assessment/Plan   Problem List Items Addressed This Visit       Conductive hearing loss in left ear     Has hearing aid          Papanicolaou smear of cervix with atypical squamous cells cannot exclude high grade squamous intraepithelial lesion (ASC-H)     Pt had abnormal pap in late March  She is scheduled to have colposcopy in May by GYN  Has had abnormal results in past           Other Visit Diagnoses       Routine general medical examination at a health care facility    -  Primary    Relevant Orders    Comprehensive Metabolic Panel    CBC    Follow Up In Primary Care - Health Maintenance    Screening for lipid disorders        Relevant Orders    Lipid Panel    Screening for thyroid disorder        Relevant Orders    TSH with reflex to Free T4 if abnormal

## 2025-04-11 NOTE — PATIENT INSTRUCTIONS
Get fasting blood work done when able-order is in-no food after midnight-can have water/black coffee in AM   Continue healthy diet-lean protein/fish/veggies and low carb/sugar  Exercise regularly-goal of 150 minutes/week  See GYN and have colposcopy as directed/schedule  See eye doc annually for exam  Follow up here in 1 year-sooner if needed

## 2025-04-11 NOTE — ASSESSMENT & PLAN NOTE
Pt had abnormal pap in late March  She is scheduled to have colposcopy in May by GYN  Has had abnormal results in past

## 2025-04-22 ENCOUNTER — APPOINTMENT (OUTPATIENT)
Dept: OTOLARYNGOLOGY | Facility: CLINIC | Age: 35
End: 2025-04-22
Payer: COMMERCIAL

## 2025-05-14 ENCOUNTER — APPOINTMENT (OUTPATIENT)
Dept: OBSTETRICS AND GYNECOLOGY | Facility: CLINIC | Age: 35
End: 2025-05-14
Payer: COMMERCIAL

## 2025-05-14 VITALS
SYSTOLIC BLOOD PRESSURE: 112 MMHG | HEIGHT: 62 IN | BODY MASS INDEX: 28.34 KG/M2 | DIASTOLIC BLOOD PRESSURE: 66 MMHG | WEIGHT: 154 LBS

## 2025-05-14 DIAGNOSIS — R87.611 ATYPICAL SQUAMOUS CELLS CANNOT EXCLUDE HIGH GRADE SQUAMOUS INTRAEPITHELIAL LESION ON CYTOLOGIC SMEAR OF CERVIX (ASC-H): ICD-10-CM

## 2025-05-14 LAB — PREGNANCY TEST URINE, POC: NEGATIVE

## 2025-05-14 PROCEDURE — 81025 URINE PREGNANCY TEST: CPT | Performed by: OBSTETRICS & GYNECOLOGY

## 2025-05-14 PROCEDURE — 57454 BX/CURETT OF CERVIX W/SCOPE: CPT | Performed by: OBSTETRICS & GYNECOLOGY

## 2025-05-14 NOTE — PROGRESS NOTES
Patient ID: Peng Jovel is a 35 y.o. female.    Colposcopy    Date/Time: 5/14/2025 2:17 PM    Performed by: Mayi Ayala MD  Authorized by: Mayi Ayala MD    Procedure location: cervix    Consent:     Patient questions answered: yes      Risks and benefits of the procedure and its alternatives discussed: yes      Procedural risks discussed:  Repeat procedure, possible continued pain and bleeding    Consent obtained:  Written    Consent given by:  Patient  Universal Protocol:     A time out verifies correct patient, procedure, equipment, support staff, and site/side marked as required: yes      Patient states understanding of procedure being performed: yes      Test results available and properly labeled: yes      Required blood products, implants, devices, and special equipment available: yes    Indication:     Cervical indication(s): ASCUS and ASC-H    Pre-procedure:     Speculum was placed in the vagina: yes      Prep solution(s): acetic acid and Lugol's iodine    Procedure:     Colposcopy with: cervical biopsy and endocervical curettage      Biopsy taken: yes      Cervix visibility: fully visualized      SCJ visibility: fully visualized      Lesion visualized: fully visualized      Acetowhite lesion(s): cervix      Acetowhite lesion(s) description:  No acetowhite changes    Vascular changes: cervix      Vascular lesion description:  Punctations    Other lesion(s): cervix      Cervical impression: normal/benign      Ferric subsulfate solution applied: yes      Tampon inserted: no    Post-procedure:     Patient tolerance of procedure:  Patient tolerated the procedure well with no immediate complications    Estimated blood loss (mL):  2    8 mm protrusion of endocervix here.

## 2025-05-24 LAB
LABORATORY COMMENT REPORT: NORMAL
PATH REPORT.COMMENTS IMP SPEC: NORMAL
PATH REPORT.FINAL DX SPEC: NORMAL
PATH REPORT.GROSS SPEC: NORMAL
PATH REPORT.RELEVANT HX SPEC: NORMAL
PATH REPORT.TOTAL CANCER: NORMAL

## 2025-06-02 ENCOUNTER — APPOINTMENT (OUTPATIENT)
Dept: PRIMARY CARE | Facility: CLINIC | Age: 35
End: 2025-06-02
Payer: COMMERCIAL

## 2025-06-16 ENCOUNTER — APPOINTMENT (OUTPATIENT)
Dept: PODIATRY | Facility: CLINIC | Age: 35
End: 2025-06-16
Payer: COMMERCIAL

## 2025-06-16 DIAGNOSIS — M20.11 HALLUX VALGUS, BILATERAL: Primary | ICD-10-CM

## 2025-06-16 DIAGNOSIS — M20.12 HALLUX VALGUS, BILATERAL: Primary | ICD-10-CM

## 2025-06-16 PROCEDURE — 99203 OFFICE O/P NEW LOW 30 MIN: CPT | Performed by: PODIATRIST

## 2025-06-16 PROCEDURE — 1036F TOBACCO NON-USER: CPT | Performed by: PODIATRIST

## 2025-06-16 NOTE — PROGRESS NOTES
History Of Present Illness  Peng Jovel is a 35 y.o. female presenting with chief complaint of: bunion b/l feet. Right surgically repaired 8 yrs ago.  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  She states her left hurts more than her right.  She is not involved in an active exercise program.  PCP Dalila Senior DO  Last visit 25     Past Medical History  She has a past medical history of Acute upper respiratory infection, unspecified (2017), Conductive hearing loss, bilateral (06/15/2021), Hydronephrosis (2023), Kidney stone, Left ureteral stone (2023), Microtia (06/15/2021), Myopia, bilateral (2021), Other congenital deformities of skull, face and jaw, Other disturbances of smell and taste (2019), Other symptoms and signs involving general sensations and perceptions (2019), Otitis media, unspecified, left ear (2022), Otorrhea, left ear (06/15/2021), Perforation of left tympanic membrane (2023), Personal history of (corrected) cleft lip and palate (2014), Personal history of other complications of pregnancy, childbirth and the puerperium (2022), Personal history of other diseases of the nervous system and sense organs, Personal history of other diseases of the respiratory system (2017), Personal history of other diseases of the respiratory system (2019), Personal history of other diseases of the respiratory system (2019), Personal history of other diseases of the respiratory system (2020), Personal history of other specified conditions (2019), Personal history of other specified conditions (2020), Sneezing (2020), and Unspecified otitis externa, left ear (2021).    Surgical History  She has a past surgical history that includes Nose surgery (2014); Mandible surgery (2014); Other surgical history (2014); IR nephrostomy tube exchange (12/15/2021);  section, low  transverse (01/28/2022); Cervical biopsy w/ loop electrode excision (11/02/2022); and Implant.     Social History  She reports that she has never smoked. She has never used smokeless tobacco. She reports current alcohol use. She reports that she does not use drugs.    Family History  Family History[1]     Allergies  Adhesive tape-silicones, Diphenhydramine, and Sulfa (sulfonamide antibiotics)    Medications  Current Medications[2]    Review of Systems    REVIEW OF SYSTEMS  GENERAL:  Negative for malaise, significant weight loss, fever  CARDIOVASCULAR: leg swelling   MUSCULOSKELETAL:  Negative for joint pain or swelling, back pain, and muscle pain.  SKIN:  Negative for lesions, rash, and itching  PSYCH:  Negative for sleep disturbance, mood disorder and recent psychosocial stressors  NEURO: Negative, denies any burning, tingling or numbness     Objective:   Vasc: DP and PT pulses are palpable bilateral.  CFT is less than 3 seconds bilateral.  Skin temperature is warm to cool proximal to distal bilateral.      Neuro:  Light touch is intact to the foot bilateral.  Protective sensation is intact to the foot when tested with the 5.07 SWM bilateral.  There is no clonus noted.  The hallux is downgoing bilateral.      Derm: Nails are normal. Skin is supple with normal texture and turgor noted.  Webspaces are clean, dry and intact bilateral.  There are no hyperkeratoses, ulcerations, verruca or other lesions noted.      Ortho: Muscle strength is 5/5 for all pedal groups tested.  Ankle joint, subtalar joint, 1st MPJ and lesser MPJ ROM is full and without pain or crepitus.  The foot type is rectus bilateral off weight bearing.  Patient does have bilateral bunion deformity.  Her left is more pronounced than her right.  Range of motion through the first MPJ is without crepitus.  Assessment/Plan     Diagnoses and all orders for this visit:  Hallux valgus, bilateral      Patient does have bilateral bunion deformity.  She does have  residual deformity on the right status post correction.  We discussed the benefits risks of surgery.  At this time patient is not ready to commit to any sort of surgical intervention.  I agree with her decision at this time.  We will avoid x-rays until she is ready for surgical consultation.  At this time I have made some recommendations of different shoe gear for the patient to try.  Patient is agreeable.           Elvia Mcdonald CMA         [1]   Family History  Problem Relation Name Age of Onset    No Known Problems Mother      No Known Problems Father      No Known Problems Sister      No Known Problems Son      Pancreatic cancer Maternal Grandmother     [2]   Current Outpatient Medications   Medication Sig Dispense Refill    drospirenone, contraceptive, 4 mg (28) tablet Take 1 tablet by mouth once daily. 84 tablet 4     No current facility-administered medications for this visit.

## 2025-06-17 ENCOUNTER — APPOINTMENT (OUTPATIENT)
Dept: OTOLARYNGOLOGY | Facility: CLINIC | Age: 35
End: 2025-06-17
Payer: COMMERCIAL

## 2025-06-23 ENCOUNTER — APPOINTMENT (OUTPATIENT)
Dept: OTOLARYNGOLOGY | Facility: CLINIC | Age: 35
End: 2025-06-23
Payer: COMMERCIAL

## 2025-07-17 ENCOUNTER — APPOINTMENT (OUTPATIENT)
Dept: OTOLARYNGOLOGY | Facility: CLINIC | Age: 35
End: 2025-07-17
Payer: COMMERCIAL

## 2025-07-20 LAB
ALBUMIN SERPL-MCNC: 4.4 G/DL (ref 3.6–5.1)
ALP SERPL-CCNC: 56 U/L (ref 31–125)
ALT SERPL-CCNC: 10 U/L (ref 6–29)
ANION GAP SERPL CALCULATED.4IONS-SCNC: 9 MMOL/L (CALC) (ref 7–17)
AST SERPL-CCNC: 13 U/L (ref 10–30)
BILIRUB SERPL-MCNC: 0.4 MG/DL (ref 0.2–1.2)
BUN SERPL-MCNC: 10 MG/DL (ref 7–25)
CALCIUM SERPL-MCNC: 9.1 MG/DL (ref 8.6–10.2)
CHLORIDE SERPL-SCNC: 104 MMOL/L (ref 98–110)
CHOLEST SERPL-MCNC: 168 MG/DL
CHOLEST/HDLC SERPL: 4.9 (CALC)
CO2 SERPL-SCNC: 25 MMOL/L (ref 20–32)
CREAT SERPL-MCNC: 0.85 MG/DL (ref 0.5–0.97)
EGFRCR SERPLBLD CKD-EPI 2021: 92 ML/MIN/1.73M2
ERYTHROCYTE [DISTWIDTH] IN BLOOD BY AUTOMATED COUNT: 13.5 % (ref 11–15)
GLUCOSE SERPL-MCNC: 94 MG/DL (ref 65–99)
HCT VFR BLD AUTO: 41.3 % (ref 35–45)
HDLC SERPL-MCNC: 34 MG/DL
HGB BLD-MCNC: 13.7 G/DL (ref 11.7–15.5)
LDLC SERPL CALC-MCNC: 103 MG/DL (CALC)
MCH RBC QN AUTO: 28.4 PG (ref 27–33)
MCHC RBC AUTO-ENTMCNC: 33.2 G/DL (ref 32–36)
MCV RBC AUTO: 85.7 FL (ref 80–100)
NONHDLC SERPL-MCNC: 134 MG/DL (CALC)
PLATELET # BLD AUTO: 289 THOUSAND/UL (ref 140–400)
PMV BLD REES-ECKER: 9 FL (ref 7.5–12.5)
POTASSIUM SERPL-SCNC: 4 MMOL/L (ref 3.5–5.3)
PROT SERPL-MCNC: 7.1 G/DL (ref 6.1–8.1)
RBC # BLD AUTO: 4.82 MILLION/UL (ref 3.8–5.1)
SODIUM SERPL-SCNC: 138 MMOL/L (ref 135–146)
TRIGL SERPL-MCNC: 191 MG/DL
TSH SERPL-ACNC: 2.42 MIU/L
WBC # BLD AUTO: 9.7 THOUSAND/UL (ref 3.8–10.8)

## 2025-07-24 ENCOUNTER — APPOINTMENT (OUTPATIENT)
Dept: OBSTETRICS AND GYNECOLOGY | Facility: CLINIC | Age: 35
End: 2025-07-24
Payer: COMMERCIAL

## 2025-07-31 ENCOUNTER — APPOINTMENT (OUTPATIENT)
Dept: PRIMARY CARE | Facility: CLINIC | Age: 35
End: 2025-07-31
Payer: COMMERCIAL

## 2025-08-04 ENCOUNTER — APPOINTMENT (OUTPATIENT)
Dept: PRIMARY CARE | Facility: CLINIC | Age: 35
End: 2025-08-04
Payer: COMMERCIAL

## 2025-08-07 ENCOUNTER — APPOINTMENT (OUTPATIENT)
Dept: PRIMARY CARE | Facility: CLINIC | Age: 35
End: 2025-08-07
Payer: COMMERCIAL

## 2025-08-11 ENCOUNTER — CLINICAL SUPPORT (OUTPATIENT)
Dept: AUDIOLOGY | Facility: CLINIC | Age: 35
End: 2025-08-11
Payer: COMMERCIAL

## 2025-08-11 DIAGNOSIS — H90.A31 MIXED CONDUCTIVE AND SENSORINEURAL HEARING LOSS OF RIGHT EAR WITH RESTRICTED HEARING OF LEFT EAR: ICD-10-CM

## 2025-08-11 DIAGNOSIS — H90.A12 CONDUCTIVE HEARING LOSS OF LEFT EAR WITH RESTRICTED HEARING OF RIGHT EAR: Primary | ICD-10-CM

## 2025-08-21 ENCOUNTER — APPOINTMENT (OUTPATIENT)
Dept: PRIMARY CARE | Facility: CLINIC | Age: 35
End: 2025-08-21
Payer: COMMERCIAL

## 2025-09-17 ENCOUNTER — APPOINTMENT (OUTPATIENT)
Dept: OBSTETRICS AND GYNECOLOGY | Facility: CLINIC | Age: 35
End: 2025-09-17
Payer: COMMERCIAL

## 2025-09-25 ENCOUNTER — APPOINTMENT (OUTPATIENT)
Dept: OBSTETRICS AND GYNECOLOGY | Facility: CLINIC | Age: 35
End: 2025-09-25
Payer: COMMERCIAL

## 2026-04-01 ENCOUNTER — APPOINTMENT (OUTPATIENT)
Dept: OBSTETRICS AND GYNECOLOGY | Facility: CLINIC | Age: 36
End: 2026-04-01
Payer: COMMERCIAL

## 2026-04-14 ENCOUNTER — APPOINTMENT (OUTPATIENT)
Dept: PRIMARY CARE | Facility: CLINIC | Age: 36
End: 2026-04-14
Payer: COMMERCIAL